# Patient Record
Sex: MALE | Race: WHITE | HISPANIC OR LATINO | Employment: FULL TIME | URBAN - METROPOLITAN AREA
[De-identification: names, ages, dates, MRNs, and addresses within clinical notes are randomized per-mention and may not be internally consistent; named-entity substitution may affect disease eponyms.]

---

## 2020-12-18 ENCOUNTER — OFFICE VISIT - HEALTHEAST (OUTPATIENT)
Dept: FAMILY MEDICINE | Facility: CLINIC | Age: 45
End: 2020-12-18

## 2020-12-18 DIAGNOSIS — R51.9 ACUTE NONINTRACTABLE HEADACHE, UNSPECIFIED HEADACHE TYPE: ICD-10-CM

## 2020-12-18 DIAGNOSIS — R11.2 NON-INTRACTABLE VOMITING WITH NAUSEA, UNSPECIFIED VOMITING TYPE: ICD-10-CM

## 2020-12-18 DIAGNOSIS — R09.02 HYPOXIA: ICD-10-CM

## 2020-12-19 ENCOUNTER — COMMUNICATION - HEALTHEAST (OUTPATIENT)
Dept: SCHEDULING | Facility: CLINIC | Age: 45
End: 2020-12-19

## 2020-12-20 ENCOUNTER — COMMUNICATION - HEALTHEAST (OUTPATIENT)
Dept: SCHEDULING | Facility: CLINIC | Age: 45
End: 2020-12-20

## 2020-12-21 ENCOUNTER — COMMUNICATION - HEALTHEAST (OUTPATIENT)
Dept: NURSING | Facility: CLINIC | Age: 45
End: 2020-12-21

## 2020-12-21 ENCOUNTER — AMBULATORY - HEALTHEAST (OUTPATIENT)
Dept: NURSING | Facility: CLINIC | Age: 45
End: 2020-12-21

## 2020-12-21 DIAGNOSIS — U07.1 2019 NOVEL CORONAVIRUS DISEASE (COVID-19): ICD-10-CM

## 2021-06-05 VITALS
SYSTOLIC BLOOD PRESSURE: 128 MMHG | HEART RATE: 103 BPM | DIASTOLIC BLOOD PRESSURE: 83 MMHG | OXYGEN SATURATION: 91 % | WEIGHT: 148 LBS

## 2021-06-13 NOTE — TELEPHONE ENCOUNTER
"Coronavirus (COVID-19) Notification    Caller Name (Patient, parent, daughter/son, grandparent, etc)  Patient     Reason for call  Notify of Positive Coronavirus (COVID-19) lab results, assess symptoms,  review [facility] recommendations    Lab Result    Lab test:  2019-nCoV rRt-PCR or SARS-CoV-2 PCR    Oropharyngeal AND/OR nasopharyngeal swabs is POSITIVE for 2019-nCoV RNA/SARS-COV-2 PCR (COVID-19 virus)    RN Recommendations/Instructions per [facility] Coronavirus COVID-19 recommendations    Brief introduction script  Introduce self and then review script:  \"I am calling on behalf of [employer].  We were notified that your Coronavirus test (COVID-19) for was POSITIVE for the virus.  I have some information to relay to you but first I wanted to mention that the [place] Dept of Health will be contacting you shortly [it's possible MD already called Patient] to talk to you more about how you are feeling and other people you have had contact with who might now also have the virus.  Also, [facility] is Partnering with the [facility] for Covid-19 research, you may be contacted directly by research staff.\"    Assessment (Inquire about Patient's current symptoms)   Assessment   Current Symptoms at time of phone call: (if no symptoms, document No symptoms] headache   Symptom onset (if applicable) 12/17/20     If at time of call, Patients symptoms hare worsened, the Patient should contact 911 or have someone drive them to Emergency Dept promptly:      If Patient calling 911, inform 911 personal that you have tested positive for the Coronavirus (COVID-19).  Place mask on and await 911 to arrive.    If Emergency Dept, If possible, please have another adult drive you to the Emergency Dept but you need to wear mask when in contact with other people.        Monoclonal Antibody Administration    You may be eligible to receive a new treatment with a monoclonal antibody for preventing hospitalization in " "patients at high risk for complications from COVID-19.   This medication is still experimental and available on a limited basis; it is given through an IV and must be given at an infusion center. Please note that not all people who are eligible will receive the medication since it is in limited supply.     Are you interested in being considered for this medication?  No.  Does the patient fit the criteria: No    If patient qualifies based on above criteria:  \"We will contact you if you are selected to receive the medication in the next 1-2 days.   This is time sensitive and if you are not selected in the next 1-2 days, you will not receive the medication.  If you do not receive a call to schedule, you have not been selected.\"    Review information with Patient    Your result was positive. This means you have COVID-19 (coronavirus).  We have sent you a letter that reviews the information that I'll be reviewing with you now.    How can I protect others?    If you have symptoms: stay home and away from others (self-isolate) until:    You've had no fever--and no medicine that reduces fever--for 1 full day (24 hours). And      Your other symptoms have gotten better. For example, your cough or breathing has improved. And     At least 10 days have passed since your symptoms started. (If you ve been told by a doctor that you have a weak immune system, wait 20 days.)     If you don't have symptoms: Stay home and away from others (self-isolate) until at least 10 days have passed since your first positive COVID-19 test. (Date test collected).    During this time:    Stay in your own room, including for meals. Use your own bathroom if you can.    Stay away from others in your home. No hugging, kissing or shaking hands. No visitors.     Don't go to work, school or anywhere else.     Clean  high touch  surfaces often (doorknobs, counters, handles, etc.). Use a household cleaning spray or wipes. You'll find a full list on the EPA " website at www.epa.gov/pesticide-registration/list-n-disinfectants-use-against-sars-cov-2.     Cover your mouth and nose with a mask, tissue or other face covering to avoid spreading germs.    Wash your hands and face often with soap and water.    Caregivers in these groups are at risk for severe illness due to COVID-19:  o People 65 years and older  o People who live in a nursing home or long-term care facility  o People with chronic disease (lung, heart, cancer, diabetes, kidney, liver, immunologic)  o People who have a weakened immune system, including those who:  - Are in cancer treatment  - Take medicine that weakens the immune system, such as corticosteroids  - Had a bone marrow or organ transplant  - Have an immune deficiency  - Have poorly controlled HIV or AIDS  - Are obese (body mass index of 40 or higher)  - Smoke regularly    Caregivers should wear gloves while washing dishes, handling laundry and cleaning bedrooms and bathrooms.    Wash and dry laundry with special caution. Don't shake dirty laundry, and use the warmest water setting you can.    If you have a weakened immune system, ask your doctor about other actions you should take.    For more tips, go to www.cdc.gov/coronavirus/2019-ncov/downloads/10Things.pdf.    You should not go back to work until you meet the guidelines above for ending your home isolation. You don't need to be retested for COVID-19 before going back to work--studies show that you won't spread the virus if it's been at least 10 days since your symptoms started (or 20 days, if you have a weak immune system).    Employers: This document serves as formal notice of your employee's medical guidelines for going back to work. They must meet the above guidelines before going back to work in person.    How can I take care of myself?    1. Get lots of rest. Drink extra fluids (unless a doctor has told you not to).    2. Take Tylenol (acetaminophen) for fever or pain. If you have liver or  kidney problems, ask your family doctor if it's okay to take Tylenol.     Take either:     650 mg (two 325 mg pills) every 4 to 6 hours, or     1,000 mg (two 500 mg pills) every 8 hours as needed.     Note: Don't take more than 3,000 mg in one day. Acetaminophen is found in many medicines (both prescribed and over-the-counter medicines). Read all labels to be sure you don't take too much.    For children, check the Tylenol bottle for the right dose (based on their age or weight).    3. If you have other health problems (like cancer, heart failure, an organ transplant or severe kidney disease): Call your specialty clinic if you don't feel better in the next 2 days.    4. Know when to call 911: Emergency warning signs include:    Trouble breathing or shortness of breath    Pain or pressure in the chest that doesn't go away    Feeling confused like you haven't felt before, or not being able to wake up    Bluish-colored lips or face    5. Sign up for [employer]. We know it's scary to hear that you have COVID-19. We want to track your symptoms to make sure you're okay over the next 2 weeks. Please look for an email from [employer]--this is a free, online program that we'll use to keep in touch. To sign up, follow the link in the email. Learn more at [email or web address].    Where can I get more information?    [facility]: [email or web address]    Coronavirus Basics: www.health.[facility].[place]/diseases/coronavirus/basics.html    What to Do If You're Sick: www.cdc.gov/coronavirus/2019-ncov/about/steps-when-sick.html    Ending Home Isolation: www.cdc.gov/coronavirus/2019-ncov/hcp/disposition-in-home-patients.html     Caring for Someone with COVID-19: www.cdc.gov/coronavirus/2019-ncov/if-you-are-sick/care-for-someone.html     [facility] clinical trials (COVID-19 research studies): [email or web address]-[email or web address]     A Positive COVID-19 letter will be sent via [employer] or the  Mail.  (Exception, no letters sent to Presurgerical/Preprocedure Patients)    Sergio Watts RN

## 2021-06-13 NOTE — PROGRESS NOTES
Clinic Care Coordination Contact  [facility]/Voicemail    Clinical Data: Care Coordinator Outreach  Outreach attempted x 1.  Left message on patient's voicemail with call back information and requested return call.  Plan: Care Coordinator will try to reach patient again in 1-2 business days.

## 2021-06-13 NOTE — PROGRESS NOTES
Clinic Care Coordination Contact  [facility]/Voicemail    Clinical Data: Care Coordinator Outreach  Outreach attempted x 2.  Left message on patient's voicemail with call back information and requested return call.  Plan: Care Coordinator will do no further outreaches at this time.

## 2021-06-13 NOTE — PROGRESS NOTES
Impression:  Headache, vomiting, weakness in a patient who was recently exposed to Covid.  He did have a negative Covid test but may have had a false negative test and now has Covid pneumonia with hypoxia.  He also uses some kind of a portable heater in his bedroom and may have carbon monoxide poisoning as well or other causes    Plan:  Given the hypoxia and tachycardia, he is can need further evaluation work-up.  We will send [facility]      Chief Complaint:  Chief Complaint   Patient presents with     Nausea     Headache     Suspected Covid     possible covid exposure         HPI:   Robert Pimentel is a 45 y.o. male who presents to this clinic for the evaluation of headache vomiting and weakness.  Patient awoke during the middle of the night with a severe headache, vomiting, and generalized weakness.  He did feel short of breath but did not have a cough.  He was exposed to someone with Covid 8 days ago, he had a negative Covid test after that.  No diarrhea, no skin rash.  He uses a portable heater in his room, he is not sure but he thinks it is electric.  He is not sure what kind a heating his house has otherwise.  Nobody else in the house got sick but he is worried about carbon monoxide as a possible cause because of the portable heater      PMH:   No past medical history on file.  No past surgical history on file.      ROS:  All other systems negative    Meds:  No current outpatient medications on file.        Social:  Social History     Socioeconomic History     Marital status: Single     Spouse name: Not on file     Number of children: Not on file     Years of education: Not on file     Highest education level: Not on file   Occupational History     Not on file   Social Needs     Financial resource strain: Not on file     Food insecurity     Worry: Not on file     Inability: Not on file     Transportation needs     Medical: Not on file     Non-medical: Not on file    Tobacco Use     Smoking status: Not on file   Substance and Sexual Activity     Alcohol use: Not on file     Drug use: Not on file     Sexual activity: Not on file   Lifestyle     Physical activity     Days per week: Not on file     Minutes per session: Not on file     Stress: Not on file   Relationships     Social connections     Talks on phone: Not on file     Gets together: Not on file     Attends Protestant service: Not on file     Active member of club or organization: Not on file     Attends meetings of clubs or organizations: Not on file     Relationship status: Not on file     Intimate partner violence     Fear of current or ex partner: Not on file     Emotionally abused: Not on file     Physically abused: Not on file     Forced sexual activity: Not on file   Other Topics Concern     Not on file   Social History Narrative     Not on file         Physical Exam:  Vitals:    12/18/20 1613   BP: 128/83   Patient Site: Right Arm   Patient Position: Sitting   Cuff Size: Adult Regular   Pulse: (!) 103   SpO2: 91%   Weight: 148 lb (67.1 kg)      Vital signs reviewed  Eyes: PERRL, EOMI  Head: Atraumatic and normocephalic  Lungs: Shallow inspiratory efforts, he coughs during expiration, I do not hear any rales or rhonchi or wheezes  CV: Regular without murmur  Abdomen: Nontender without mass  Extremities: No tenderness or deformity  Skin: No lesions or rash  Neuro: Normal motor and sensory function in all extremities  Psych: Awake, alert, normally responsive      Results:    No results found for this or any previous visit (from the past 24 hour(s)).    No results found.      Mina Esteves MD

## 2022-03-23 ENCOUNTER — ANCILLARY PROCEDURE (OUTPATIENT)
Dept: GENERAL RADIOLOGY | Facility: CLINIC | Age: 47
End: 2022-03-23
Attending: PHYSICIAN ASSISTANT
Payer: COMMERCIAL

## 2022-03-23 ENCOUNTER — OFFICE VISIT (OUTPATIENT)
Dept: FAMILY MEDICINE | Facility: CLINIC | Age: 47
End: 2022-03-23
Payer: COMMERCIAL

## 2022-03-23 VITALS
SYSTOLIC BLOOD PRESSURE: 128 MMHG | BODY MASS INDEX: 22.13 KG/M2 | OXYGEN SATURATION: 97 % | WEIGHT: 146 LBS | HEART RATE: 78 BPM | HEIGHT: 68 IN | DIASTOLIC BLOOD PRESSURE: 80 MMHG

## 2022-03-23 DIAGNOSIS — M25.531 PAIN IN BOTH WRISTS: ICD-10-CM

## 2022-03-23 DIAGNOSIS — M25.532 PAIN IN BOTH WRISTS: ICD-10-CM

## 2022-03-23 DIAGNOSIS — M25.531 PAIN IN BOTH WRISTS: Primary | ICD-10-CM

## 2022-03-23 DIAGNOSIS — L98.9 SKIN LESION: ICD-10-CM

## 2022-03-23 DIAGNOSIS — M25.532 PAIN IN BOTH WRISTS: Primary | ICD-10-CM

## 2022-03-23 DIAGNOSIS — R52 PAIN: ICD-10-CM

## 2022-03-23 LAB
ANION GAP SERPL CALCULATED.3IONS-SCNC: 11 MMOL/L (ref 5–18)
BUN SERPL-MCNC: 12 MG/DL (ref 8–22)
C REACTIVE PROTEIN LHE: <0.1 MG/DL (ref 0–0.8)
CALCIUM SERPL-MCNC: 9.4 MG/DL (ref 8.5–10.5)
CHLORIDE BLD-SCNC: 108 MMOL/L (ref 98–107)
CO2 SERPL-SCNC: 23 MMOL/L (ref 22–31)
CREAT SERPL-MCNC: 0.64 MG/DL (ref 0.7–1.3)
ERYTHROCYTE [DISTWIDTH] IN BLOOD BY AUTOMATED COUNT: 12.9 % (ref 10–15)
ERYTHROCYTE [SEDIMENTATION RATE] IN BLOOD BY WESTERGREN METHOD: 5 MM/HR (ref 0–15)
GFR SERPL CREATININE-BSD FRML MDRD: >90 ML/MIN/1.73M2
GLUCOSE BLD-MCNC: 90 MG/DL (ref 70–125)
HCT VFR BLD AUTO: 44.8 % (ref 40–53)
HGB BLD-MCNC: 15.4 G/DL (ref 13.3–17.7)
MCH RBC QN AUTO: 29.3 PG (ref 26.5–33)
MCHC RBC AUTO-ENTMCNC: 34.4 G/DL (ref 31.5–36.5)
MCV RBC AUTO: 85 FL (ref 78–100)
PLATELET # BLD AUTO: 245 10E3/UL (ref 150–450)
POTASSIUM BLD-SCNC: 4.4 MMOL/L (ref 3.5–5)
RBC # BLD AUTO: 5.26 10E6/UL (ref 4.4–5.9)
RHEUMATOID FACT SER NEPH-ACNC: <15 IU/ML (ref 0–30)
SODIUM SERPL-SCNC: 142 MMOL/L (ref 136–145)
WBC # BLD AUTO: 5 10E3/UL (ref 4–11)

## 2022-03-23 PROCEDURE — 86140 C-REACTIVE PROTEIN: CPT | Performed by: PHYSICIAN ASSISTANT

## 2022-03-23 PROCEDURE — 86200 CCP ANTIBODY: CPT | Performed by: PHYSICIAN ASSISTANT

## 2022-03-23 PROCEDURE — 86039 ANTINUCLEAR ANTIBODIES (ANA): CPT | Performed by: PHYSICIAN ASSISTANT

## 2022-03-23 PROCEDURE — 73110 X-RAY EXAM OF WRIST: CPT | Mod: TC | Performed by: RADIOLOGY

## 2022-03-23 PROCEDURE — 99214 OFFICE O/P EST MOD 30 MIN: CPT | Performed by: PHYSICIAN ASSISTANT

## 2022-03-23 PROCEDURE — 85652 RBC SED RATE AUTOMATED: CPT | Performed by: PHYSICIAN ASSISTANT

## 2022-03-23 PROCEDURE — 80048 BASIC METABOLIC PNL TOTAL CA: CPT | Performed by: PHYSICIAN ASSISTANT

## 2022-03-23 PROCEDURE — 85027 COMPLETE CBC AUTOMATED: CPT | Performed by: PHYSICIAN ASSISTANT

## 2022-03-23 PROCEDURE — 86038 ANTINUCLEAR ANTIBODIES: CPT | Performed by: PHYSICIAN ASSISTANT

## 2022-03-23 PROCEDURE — 86431 RHEUMATOID FACTOR QUANT: CPT | Performed by: PHYSICIAN ASSISTANT

## 2022-03-23 PROCEDURE — 36415 COLL VENOUS BLD VENIPUNCTURE: CPT | Performed by: PHYSICIAN ASSISTANT

## 2022-03-23 ASSESSMENT — ENCOUNTER SYMPTOMS
EYE PAIN: 0
FEVER: 0
SHORTNESS OF BREATH: 0
DIAPHORESIS: 0
WHEEZING: 0
EYE DISCHARGE: 0
NAUSEA: 0
FATIGUE: 0
ABDOMINAL PAIN: 0
COUGH: 0
DIZZINESS: 0
SORE THROAT: 0
HEADACHES: 0
JOINT SWELLING: 0
EYE REDNESS: 0
PHOTOPHOBIA: 0
CHILLS: 0
EYE ITCHING: 0
NUMBNESS: 0
RHINORRHEA: 0
PALPITATIONS: 0
VOMITING: 0
SINUS PAIN: 0
DIARRHEA: 0
ACTIVITY CHANGE: 0

## 2022-03-23 NOTE — PROGRESS NOTES
Progress Note  3/23/22     Chief Complaint   Patient presents with     Hand Pain     both wrist pains, shoot pain at times, on going for years , lifting for work      Mole     mole on back of head,change in size getting bigger          HPI    Robert Pimentel is a pleasant  46 year old year old male  who present to the clinic today for relation of bilateral wrist pain and a skin lesion to the posterior aspect of his head.  He states that he has had wrist pain for about 3 to 4 years.  Over the last 6 months the pain has gotten worse.  The pain does come and go but he is having more days of wrist pain than not.  He states that the pain will last for a week and subside for a few days and then return.  He does wear wrist splints that help at times but not all the time.  Has not noticed any swelling or redness to the wrists.  No injury.  He works at [employer] and does lift heavy things.  He denies any numbness or tingling in the fingers.  No rashes.    The lesion on the right posterior aspect of his head was removed about 2 years ago.  It is grown back.  It is getting larger.  Its not painful.  It does bleed at times.  He has no history of skin cancer.    ROS    Review of Systems   Constitutional: Negative for activity change, chills, diaphoresis, fatigue and fever.   HENT: Negative for congestion, ear discharge, ear pain, nosebleeds, postnasal drip, rhinorrhea, sinus pain and sore throat.    Eyes: Negative for photophobia, pain, discharge, redness and itching.   Respiratory: Negative for cough, shortness of breath and wheezing.    Cardiovascular: Negative for chest pain and palpitations.   Gastrointestinal: Negative for abdominal pain, diarrhea, nausea and vomiting.   Musculoskeletal: Negative for joint swelling.        Bilateral wrist pain.  Ongoing for 3 years.  Pain is becoming more consistent.  Worse with range of motion of the wrist.  Better at rest.  No associated swelling that he has noted.  No injuries.  No numbness  "or tingling in the fingers.  Wearing splints helps on occasion.  He has not tried Tylenol or ibuprofen ice or heat.   Skin: Positive for rash.        Skin lesion on the posterior aspect of the right head.  Getting larger.  Was removed 2 years ago and has grown back.  No pain.  Does bleed at times.   Neurological: Negative for dizziness, numbness and headaches.            There is no problem list on file for this patient.       No past medical history on file.       Social History     Socioeconomic History     Marital status: Single     Spouse name: Not on file     Number of children: Not on file     Years of education: Not on file     Highest education level: Not on file   Occupational History     Not on file   Tobacco Use     Smoking status: Never Smoker     Smokeless tobacco: Never Used   Substance and Sexual Activity     Alcohol use: Not on file     Drug use: Not on file     Sexual activity: Not on file   Other Topics Concern     Not on file   Social History Narrative     Not on file     Social Determinants of Health     Financial Resource Strain: Not on file   Food Insecurity: Not on file   Transportation Needs: Not on file   Physical Activity: Not on file   Stress: Not on file   Social Connections: Not on file   Intimate Partner Violence: Not on file   Housing Stability: Not on file         No Known Allergies       Current Outpatient Medications   Medication     albuterol (PROAIR HFA;PROVENTIL HFA;VENTOLIN HFA) 90 mcg/actuation inhaler     No current facility-administered medications for this visit.            /80   Pulse 78   Ht 1.727 m (5' 8\")   Wt 66.2 kg (146 lb)   SpO2 97%   BMI 22.20 kg/m       No results found for this or any previous visit (from the past 240 hour(s)).     Physical Exam:     Physical Exam  Vitals and nursing note reviewed.   Constitutional:       General: He is not in acute distress.     Appearance: Normal appearance. He is normal weight. He is not toxic-appearing or " diaphoretic.   HENT:      Head: Normocephalic and atraumatic.   Eyes:      Conjunctiva/sclera: Conjunctivae normal.   Cardiovascular:      Rate and Rhythm: Normal rate.      Heart sounds: No murmur heard.    No friction rub. No gallop.   Pulmonary:      Effort: Pulmonary effort is normal.      Breath sounds: No wheezing, rhonchi or rales.   Musculoskeletal:      Cervical back: Neck supple.      Comments: No pain to palpation today on exam to wrist bilaterally.  No associated swelling or redness.  Full range of motion to the wrists without eliciting pain.  Negative Tinel's and Phalen test.  Sensation to the fingers.  No pain to the elbows.  The rest of the upper extremities are intact.  Pulses to the radial and ulnar aspects of both wrists are adequate.   Skin:     General: Skin is warm.      Findings: Lesion present.      Comments: 1 cm x 1 cm lesion raised flesh-colored to the posterior aspect of right head/neck.   Neurological:      Mental Status: He is alert.              Assessment/Plan:       ICD-10-CM    1. Pain in both wrists  M25.531 XR Wrist Bilateral G/E 3 Views    M25.532 Anti Nuclear Evelin IgG by IFA with Reflex     Cyclic Citrullinated Peptide Antibody IgG     Rheumatoid factor     ESR: Erythrocyte sedimentation rate     CRP, inflammation     CBC with platelets     Basic metabolic panel   2. Skin lesion  L98.9 Adult Dermatology Referral       #1 bilateral wrist pain-symptoms have been ongoing for about 3 years.  They last a week and then subside for a few days and then return.  He does wear wrist splints that helps at times.  He has not tried ibuprofen or Tylenol.  No associated swelling.  He is not having any numbness or tingling in his hands or do not think that this is carpal tunnel.  Considerations would be either osteoarthritis of his wrist or rheumatoid arthritis.  Also would like to rule out fractures.  We will do some x-rays today to look for osteoarthritis.  We will also do some lab work to rule  out autoimmune like toward arthritis.  Labs including CBC, BMP, PEGGY, CCP, rheumatoid factor, sed rate, and CRP.  The meantime I would like him to wear his splints as this helps at times.  Try taking Tylenol and ibuprofen to see if this helps him with his pain.  We will be in touch with him with his results once these are available.      #2 skin lesion to the right posterior aspect of head -he did have this removed 2 years ago.  Has grown back and is bigger.  It is not itchy.  It does bleed at times.  It is flesh-colored.  No skin cancer history.  He is exposed to sun light in the summer.  No other skin lesions that she is aware of.  We will have him follow-up with dermatology for skin check and to evaluate the lesion.  He is to monitor symptoms in the meantime and let me know if anything changes.

## 2022-03-24 LAB — CCP AB SER IA-ACNC: <0.5 U/ML

## 2022-03-25 ENCOUNTER — TELEPHONE (OUTPATIENT)
Dept: FAMILY MEDICINE | Facility: CLINIC | Age: 47
End: 2022-03-25
Payer: COMMERCIAL

## 2022-03-25 NOTE — TELEPHONE ENCOUNTER
----- Message from Hector Alvarado PA-C sent at 3/23/2022  3:34 PM CDT -----  Please call patient and let him know that his x-ray did not show any fractures or any evidence of arthritis.  It did show some degenerative cystic changes which rises question about TFCC injury which is a tendon within the wrist.  I have placed in a referral to [facility] for further evaluation.  I would recommend wearing his wrist splints as much as possible and trialing Tylenol and ibuprofen for the pain.

## 2022-03-25 NOTE — LETTER
"March 25, 2022      Robert Pimentel  [address]  [facility] [address]      Dear Robert Pimentel,     We have been unable to reach you by phone.     Enclosed are the results of your wrist xray. Please review Jose Alvarado' note below:      \" x-ray did not show any fractures or any evidence of arthritis.  It did show some degenerative cystic changes which rises question about TFCC injury which is a tendon within the wrist.  I have placed in a referral to [facility] for further evaluation.  I would recommend wearing his wrist splints as much as possible and trialing Tylenol and ibuprofen for the pain. \"    Sincerely,     [facility]    "

## 2022-03-25 NOTE — TELEPHONE ENCOUNTER
Left message to call back for: patient  Information to relay to patient: see below      Letter sent also.

## 2022-03-30 LAB
ANA PAT SER IF-IMP: ABNORMAL
ANA SER QL IF: ABNORMAL
ANA TITR SER IF: ABNORMAL {TITER}

## 2022-04-01 ENCOUNTER — TELEPHONE (OUTPATIENT)
Dept: FAMILY MEDICINE | Facility: CLINIC | Age: 47
End: 2022-04-01
Payer: COMMERCIAL

## 2022-04-01 NOTE — TELEPHONE ENCOUNTER
----- Message from Hector Alvarado PA-C sent at 4/1/2022  6:03 AM CDT -----  Call and let him know that his PEGGY ( autoimmunity test) came back borderline positive. Since the xrays of his wrist show possible tendon issues I think this would explain his wrist pain. If follows up with ortho and they don't find any abnormality that would explain his wrist pain I would like him to follow up with me and we can repeat this test in 2-3 months.

## 2022-04-01 NOTE — TELEPHONE ENCOUNTER
Left message to call back for: Robert  Information to relay to patient: Please relay message below.

## 2022-04-27 NOTE — TELEPHONE ENCOUNTER
Pt called back, TC relayed message. Pt does not have an upcoming appt with Ortho and not sure when he will. Pt is asking for a Rx for his PEGGY. He has a Dermatology appt in a month and would like something to help while he waits for his appt. Please send Rx to [facility] in [place].

## 2022-05-04 DIAGNOSIS — M25.532 PAIN IN BOTH WRISTS: Primary | ICD-10-CM

## 2022-05-04 DIAGNOSIS — M25.531 PAIN IN BOTH WRISTS: Primary | ICD-10-CM

## 2022-05-04 NOTE — TELEPHONE ENCOUNTER
Pt calling to check status, states he is waiting for a call back about medication to help him until he sees dermatology.     Please advise

## 2022-05-04 NOTE — TELEPHONE ENCOUNTER
There is no medication I can give him for a borderline PEGGY test. I think his wrist pain may be coming from a tendon issues  (that was noted on his xray of his wrist)more than likely some due to rheumatological as this is borderline positive. I would recommend following up with Ortho and re recheck the PEGGY in 3 months. If the recheck is still elevated or higher we will have him see rheumatology at that time.

## 2022-05-05 NOTE — TELEPHONE ENCOUNTER
Pt is not asking for medication for his wrist, it is for his other issue that he is seeing dermatology for a skin lesion.

## 2022-05-06 NOTE — TELEPHONE ENCOUNTER
Left message to call back for: patient  Information to relay to patient: see below from Jose perez and route back to [place]

## 2022-05-06 NOTE — TELEPHONE ENCOUNTER
There is not much I can give him for this skin lesion. It was not itchy. Has it become itchy or what are his symptoms he is wanting to treat?If not itchy I would like him to see derm for evaluation.

## 2022-05-10 NOTE — TELEPHONE ENCOUNTER
Left message to call back for: pt  Information to relay to patient: Please see below message from Jose Alvarado and route answer back to provider.

## 2022-05-25 ENCOUNTER — OFFICE VISIT (OUTPATIENT)
Dept: FAMILY MEDICINE | Facility: CLINIC | Age: 47
End: 2022-05-25
Attending: PHYSICIAN ASSISTANT
Payer: COMMERCIAL

## 2022-05-25 VITALS — SYSTOLIC BLOOD PRESSURE: 124 MMHG | DIASTOLIC BLOOD PRESSURE: 72 MMHG

## 2022-05-25 DIAGNOSIS — D48.9 NEOPLASM OF UNCERTAIN BEHAVIOR: Primary | ICD-10-CM

## 2022-05-25 PROCEDURE — 88305 TISSUE EXAM BY PATHOLOGIST: CPT | Performed by: DERMATOLOGY

## 2022-05-25 PROCEDURE — 11102 TANGNTL BX SKIN SINGLE LES: CPT | Performed by: NURSE PRACTITIONER

## 2022-05-25 NOTE — PROGRESS NOTES
[facility] Dermatology Note  Encounter Date: May 25, 2022  Office Visit     Reviewed patients past medical history and pertinent chart review prior to patients visit today.     Dermatology Problem List:  Has had presumed wart on the posterior scalp treated with cryotherapy and shave removal without pathology 8/30/2021 but it grew back    ____________________________________________    Assessment & Plan:     # Neoplasm of uncertain behavior: Posterior scalp DDx includes verruca vulgaris vs SCC. Shave biopsy today.    Procedure Note: Biopsy by shave technique  The risks and benefits of the procedure were described to the patient. These include but are not limited to bleeding, infection, scar, incomplete removal, and non-diagnostic biopsy. Verbal informed consent was obtained. The above site(s) was cleansed with an alcohol pad and injected with 1% lidocaine with epinephrine. Once anesthesia was obtained, a biopsy(ies) was performed with Gilette blade. The tissue(s) was placed in a labeled container(s) with formalin and sent to pathology. Hemostasis was achieved with aluminum chloride. Vaseline and a bandage were applied to the wound(s). The patient tolerated the procedure well and was given post biopsy care instructions.     Clotilde Martinez, JULIETTE CNP on 5/25/2022 at 9:53 AM   _______________________________________    CC: Lesion (Back of scalp for over 2 years.)    HPI:  Mr. Robert Pimentel is a(n) 46 year old male who presents today as a new patient for presumed wart on the back of his scalp.  It has been present for about 2 years.  He saw primary care in 2021 and he says it was treated with freezing and then was cut off by his primary care provider.  I found this note from 8/30/2021 showing a shave removal but does not look like it was sent for pathology.  He would like it removed again today.    Patient is otherwise feeling well, without additional skin concerns.      Physical Exam:  Vitals: BP  124/72   SKIN: Focused examination of posterior scalp was performed.  -About a 5 to 6 mm hyperkeratotic papule on the posterior scalp    - No other lesions of concern on areas examined.     Medications:  Current Outpatient Medications   Medication     albuterol (PROAIR HFA;PROVENTIL HFA;VENTOLIN HFA) 90 mcg/actuation inhaler     No current facility-administered medications for this visit.      Past Medical History:   There is no problem list on file for this patient.    No past medical history on file.    CC Hector Alvarado PA-C  [address] [place] [address] [place] [phone] on close of this encounter.

## 2022-05-25 NOTE — LETTER
5/25/2022         RE: Robert Pimentel  [address] [facility] [address]        Dear Colleague,    Thank you for referring your patient, Robert Pimentel, to the [facility]. Please see a copy of my visit note below.    [facility] Dermatology Note  Encounter Date: May 25, 2022  Office Visit     Reviewed patients past medical history and pertinent chart review prior to patients visit today.     Dermatology Problem List:  Has had presumed wart on the posterior scalp treated with cryotherapy and shave removal without pathology 8/30/2021 but it grew back    ____________________________________________    Assessment & Plan:     # Neoplasm of uncertain behavior: Posterior scalp DDx includes verruca vulgaris vs SCC. Shave biopsy today.    Procedure Note: Biopsy by shave technique  The risks and benefits of the procedure were described to the patient. These include but are not limited to bleeding, infection, scar, incomplete removal, and non-diagnostic biopsy. Verbal informed consent was obtained. The above site(s) was cleansed with an alcohol pad and injected with 1% lidocaine with epinephrine. Once anesthesia was obtained, a biopsy(ies) was performed with Gilette blade. The tissue(s) was placed in a labeled container(s) with formalin and sent to pathology. Hemostasis was achieved with aluminum chloride. Vaseline and a bandage were applied to the wound(s). The patient tolerated the procedure well and was given post biopsy care instructions.     Clotilde Martinez, APRN CNP on 5/25/2022 at 9:53 AM   _______________________________________    CC: Lesion (Back of scalp for over 2 years.)    HPI:  Mr. Robert Pimentel is a(n) 46 year old male who presents today as a new patient for presumed wart on the back of his scalp.  It has been present for about 2 years.  He saw primary care in 2021 and he says it was treated with freezing and then was cut off by his primary care  provider.  I found this note from 8/30/2021 showing a shave removal but does not look like it was sent for pathology.  He would like it removed again today.    Patient is otherwise feeling well, without additional skin concerns.      Physical Exam:  Vitals: /72   SKIN: Focused examination of posterior scalp was performed.  -About a 5 to 6 mm hyperkeratotic papule on the posterior scalp    - No other lesions of concern on areas examined.     Medications:  Current Outpatient Medications   Medication     albuterol (PROAIR HFA;PROVENTIL HFA;VENTOLIN HFA) 90 mcg/actuation inhaler     No current facility-administered medications for this visit.      Past Medical History:   There is no problem list on file for this patient.    No past medical history on file.    CC Hector Alvarado PA-C  [address] [place] [address] on close of this encounter.       Again, thank you for allowing me to participate in the care of your patient.        Sincerely,        JULIETTE Julian CNP

## 2022-05-25 NOTE — PATIENT INSTRUCTIONS
Wound Care Instructions     FOR SUPERFICIAL WOUNDS     [facility] or     [facility] [phone]          AFTER 24 HOURS YOU SHOULD REMOVE THE BANDAGE AND BEGIN DAILY DRESSING CHANGES AS FOLLOWS:     1) Remove Dressing.     2) Clean and dry the area with tap water using a Q-tip or sterile gauze pad.     3) Apply Vaseline, Aquaphor, Polysporin ointment or Bacitracin ointment over entire wound.  Do NOT use Neosporin ointment.     4) Cover the wound with a band-aid, or a sterile non-stick gauze pad and micropore paper tape      REPEAT THESE INSTRUCTIONS AT LEAST ONCE A DAY UNTIL THE WOUND HAS COMPLETELY HEALED.    It is an old wives tale that a wound heals better when it is exposed to air and allowed to dry out. The wound will heal faster with a better cosmetic result if it is kept moist with ointment and covered with a bandage.    **Do not let the wound dry out.**      Supplies Needed:      *Cotton tipped applicators (Q-tips)    *Polysporin Ointment or Bacitracin Ointment (NOT NEOSPORIN)    *Band-aids or non-stick gauze pads and micropore paper tape.      PATIENT INFORMATION:    During the healing process you will notice a number of changes. All wounds develop a small halo of redness surrounding the wound.  This means healing is occurring. Severe itching with extensive redness usually indicates sensitivity to the ointment or bandage tape used to dress the wound.  You should call our office if this develops.      Swelling  and/or discoloration around your surgical site is common, particularly when performed around the eye.    All wounds normally drain.  The larger the wound the more drainage there will be.  After 7-10 days, you will notice the wound beginning to shrink and new skin will begin to grow.  The wound is healed when you can see skin has formed over the entire area.  A healed wound has a healthy, shiny look to the surface and is red to dark pink in color to normalize.   Wounds may take approximately 4-6 weeks to heal.  Larger wounds may take 6-8 weeks.  After the wound is healed you may discontinue dressing changes.    You may experience a sensation of tightness as your wound heals. This is normal and will gradually subside.    Your healed wound may be sensitive to temperature changes. This sensitivity improves with time, but if you re having a lot of discomfort, try to avoid temperature extremes.    Patients frequently experience itching after their wound appears to have healed because of the continue healing under the skin.  Plain Vaseline will help relieve the itching.        POSSIBLE COMPLICATIONS    BLEEDING:    Leave the bandage in place.  Use tightly rolled up gauze or a cloth to apply direct pressure over the bandage for 30  minutes.  Reapply pressure for an additional 30 minutes if necessary  Use additional gauze and tape to maintain pressure once the bleeding has stopped.

## 2022-05-27 ENCOUNTER — TELEPHONE (OUTPATIENT)
Dept: FAMILY MEDICINE | Facility: CLINIC | Age: 47
End: 2022-05-27
Payer: COMMERCIAL

## 2022-05-27 LAB
PATH REPORT.COMMENTS IMP SPEC: NORMAL
PATH REPORT.COMMENTS IMP SPEC: NORMAL
PATH REPORT.FINAL DX SPEC: NORMAL
PATH REPORT.GROSS SPEC: NORMAL
PATH REPORT.MICROSCOPIC SPEC OTHER STN: NORMAL
PATH REPORT.RELEVANT HX SPEC: NORMAL

## 2022-05-27 NOTE — TELEPHONE ENCOUNTER
Left message for pt to call dermatology nurse at [phone].  Advised there is no vm at this number, if no answer to call back at a later time.    Give Smliey's message below when pt calls back.    Shania RODRIGUEZ RN  [facility] [place]  [phone]

## 2022-05-31 NOTE — TELEPHONE ENCOUNTER
"He could try 40% salicylic acid from [employer] like \"wart stick\" nightly under occlusion but it will cause an erosion on his head in this area, or he could come in and we can treat with liquid nitrogen and discuss other options. I would allow the biopsy site to heal prior to applying wart stick though.   "

## 2022-05-31 NOTE — TELEPHONE ENCOUNTER
Pt called back and was given Smiley's reply below.  Pt is wondering if there is something that can be prescribed to help with the wart.  Has tried over the counter and it keeps coming back.  Wondering if there is something stronger?    Pharmacy pended.    Shania RODRIGUEZ RN  [facility] [place]  [phone]

## 2022-06-01 NOTE — TELEPHONE ENCOUNTER
Detailed message left with info from provider and return number to call with any questions or concerns.    Shania RODRIGUEZ RN  [facility] [place]  [phone]

## 2024-10-22 ENCOUNTER — OFFICE VISIT (OUTPATIENT)
Dept: FAMILY MEDICINE | Facility: CLINIC | Age: 49
End: 2024-10-22

## 2024-10-22 ENCOUNTER — ANCILLARY PROCEDURE (OUTPATIENT)
Dept: GENERAL RADIOLOGY | Facility: CLINIC | Age: 49
End: 2024-10-22
Attending: PHYSICIAN ASSISTANT
Payer: COMMERCIAL

## 2024-10-22 VITALS
RESPIRATION RATE: 16 BRPM | DIASTOLIC BLOOD PRESSURE: 89 MMHG | SYSTOLIC BLOOD PRESSURE: 124 MMHG | TEMPERATURE: 98.3 F | OXYGEN SATURATION: 98 % | HEART RATE: 69 BPM

## 2024-10-22 DIAGNOSIS — M22.2X1 PATELLOFEMORAL PAIN SYNDROME OF RIGHT KNEE: Primary | ICD-10-CM

## 2024-10-22 DIAGNOSIS — M25.561 ACUTE PAIN OF RIGHT KNEE: ICD-10-CM

## 2024-10-22 PROCEDURE — 99213 OFFICE O/P EST LOW 20 MIN: CPT | Performed by: PHYSICIAN ASSISTANT

## 2024-10-22 PROCEDURE — 73562 X-RAY EXAM OF KNEE 3: CPT | Mod: TC | Performed by: STUDENT IN AN ORGANIZED HEALTH CARE EDUCATION/TRAINING PROGRAM

## 2024-10-22 RX ORDER — MELOXICAM 15 MG/1
15 TABLET ORAL DAILY
Qty: 14 TABLET | Refills: 0 | Status: SHIPPED | OUTPATIENT
Start: 2024-10-22 | End: 2024-11-05

## 2024-10-22 NOTE — PROGRESS NOTES
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  Patient will be treated for anterior knee pain with patellofemoral syndrome.  Use of meloxicam for anti-inflammatory effect.  Activity modification. Expected course of resolution and indication for return was gone over and questions were answered to patient/parent's satisfaction before discharge.        ICD-10-CM    1. Patellofemoral pain syndrome of right knee  M22.2X1 meloxicam (MOBIC) 15 MG tablet      2. Acute pain of right knee  M25.561 XR Knee Right 3 Views     meloxicam (MOBIC) 15 MG tablet          Patient Instructions     Ice topically to the area 20 minutes on each hour while awake.  Take precautions to avoid damage to the skin.  After 2 days, transition to ice topically 20 minutes 3 times per day.  After 2 days may add heat and alternate ice and heat.  Mobic once a day with food for analgesia and anti-inflammatory effect.  Do not use ibuprofen products with the Mobic and do not use if you have contraindications to using NSAIDs.  Injuries to the extremities may be elevated above level of the heart continuously for the first 2 days as much as possible.  Use of over-the-counter neoprene slip on sleeve that is well fitted but not too tight to cut off circulation.    Return to see primary care provider or return to clinic for reexamination and nathaly-ray in 2 weeks if anything less than 100% resolution or return to pain-free weightbearing and full activities.      HPI:  Robert Pimentel is a 49 year old male who presents today for right anterior knee pain.  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  He tries to help lift the appliances with his knee and he has had some irritation of the right anterior knee.  He has pain with pressing on the knee and has had pain with  ascending and descending stairs and also after sitting for prolonged period of time and standing he gets anterior knee pain.  Has not had break in the skin bleeding ecchymoses or joint effusion.  At this time no involvement of the hip or ankle of the ipsilateral side or contralateral left lower extremity injury.  No treatments tried for this at home.    History obtained from chart review and the patient.    Problem List:  There are no relevant problems documented for this patient.      No past medical history on file.    Social History     Tobacco Use    Smoking status: Never    Smokeless tobacco: Never   Substance Use Topics    Alcohol use: Not on file       Review of Systems  As above in HPI otherwise negative.    Vitals:    10/22/24 1757   BP: 124/89   Pulse: 69   Resp: 16   Temp: 98.3  F (36.8  C)   TempSrc: Oral   SpO2: 98%       General: Patient is resting comfortably no acute distress is afebrile  HEENT: Head is normocephalic atraumatic   eyes are PERRL EOMI sclera anicteric   Skin: Without rash non-diaphoretic  Musculoskeletal:  Inspection of the right knee shows that the patella is not ballotable without erythema tender to palpation over the anterior medial aspect of the patella does reproduce the patient's symptoms.  Quadriceps mechanism and patellar tendon are intact.  No pain over the tibial plateau or over the anterior tibial tuberosity.  Medial and lateral collateral ligaments are intact to valgus and varus stressing.  Anterior drawer sign is negative.    Physical Exam      Labs:  Results for orders placed or performed in visit on 10/22/24   XR Knee Right 3 Views     Status: None    Narrative    EXAM: XR KNEE RIGHT 3 VIEWS  LOCATION: [facility]  DATE: 10/22/2024    INDICATION: Right patellar pain  COMPARISON: None.      Impression    IMPRESSION: Normal joint spaces and alignment. No fracture or joint effusion.       Radiology:  I have personally ordered and preliminarily  reviewed the following xray, I have noted no acute fractures or joint effusions.      At the end of the encounter, I discussed results, diagnosis, medications. Discussed red flags for immediate return to clinic/ER, as well as indications for follow up if no improvement. Patient understood and agreed to plan. Patient was stable for discharge.

## 2024-10-22 NOTE — PATIENT INSTRUCTIONS
Ice topically to the area 20 minutes on each hour while awake.  Take precautions to avoid damage to the skin.  After 2 days, transition to ice topically 20 minutes 3 times per day.  After 2 days may add heat and alternate ice and heat.  Mobic once a day with food for analgesia and anti-inflammatory effect.  Do not use ibuprofen products with the Mobic and do not use if you have contraindications to using NSAIDs.  Injuries to the extremities may be elevated above level of the heart continuously for the first 2 days as much as possible.  Use of over-the-counter neoprene slip on sleeve that is well fitted but not too tight to cut off circulation.    Return to see primary care provider or return to clinic for reexamination and nathaly-ray in 2 weeks if anything less than 100% resolution or return to pain-free weightbearing and full activities.

## 2024-12-08 ENCOUNTER — HEALTH MAINTENANCE LETTER (OUTPATIENT)
Age: 49
End: 2024-12-08

## 2024-12-12 ENCOUNTER — HOSPITAL ENCOUNTER (OUTPATIENT)
Dept: ULTRASOUND IMAGING | Facility: CLINIC | Age: 49
End: 2024-12-12
Attending: FAMILY MEDICINE
Payer: COMMERCIAL

## 2024-12-12 DIAGNOSIS — R10.32 LEFT GROIN PAIN: ICD-10-CM

## 2024-12-12 PROCEDURE — 76705 ECHO EXAM OF ABDOMEN: CPT

## 2024-12-17 ENCOUNTER — OFFICE VISIT (OUTPATIENT)
Dept: SURGERY | Facility: CLINIC | Age: 49
End: 2024-12-17
Payer: COMMERCIAL

## 2024-12-17 VITALS
WEIGHT: 153.1 LBS | SYSTOLIC BLOOD PRESSURE: 130 MMHG | DIASTOLIC BLOOD PRESSURE: 80 MMHG | HEIGHT: 63 IN | BODY MASS INDEX: 27.12 KG/M2

## 2024-12-17 DIAGNOSIS — R10.32 LEFT GROIN PAIN: ICD-10-CM

## 2024-12-17 DIAGNOSIS — K40.90 LEFT INGUINAL HERNIA: ICD-10-CM

## 2024-12-17 PROCEDURE — 99204 OFFICE O/P NEW MOD 45 MIN: CPT | Performed by: SURGERY

## 2024-12-17 NOTE — LETTER
"12/17/2024      Robert Pimentel  [address] [place]  [facility] [address]      Dear Colleague,    Thank you for referring your patient, Robert Pimentel, to the [facility] [place]. Please see a copy of my visit note below.    HPI:  Robert Pimentel is a 49 year old male who was referred to me for evaluation of a left inguinal hernia.  The patient has noticed some discomfort in the left groin for about 2 years.  More recently the discomfort, which she describes as a burning pain particular after working long days, has been more consistent.  He is also noticed a new bulge in the left groin.  He saw his primary care provider who ordered an ultrasound that revealed a hernia on the left side.  Patient is fairly anxious about this and is concerned that its become more symptomatic over the last few years.  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  He has had no prior surgeries.    Allergies:Patient has no known allergies.    PMH: Denies    PSH: Denies    No current outpatient medications on file.       No family history on file.     reports that he has never smoked. He has never been exposed to tobacco smoke. He has never used smokeless tobacco.      /80   Ht 1.6 m (5' 3\")   Wt 69.4 kg (153 lb 1.6 oz)   BMI 27.12 kg/m    Body mass index is 27.12 kg/m .    EXAM:  GENERAL: Well developed male in NAD  HEENT: Pupils are round and reactive, sclera are anicteric.   NECK:  No obvious masses or deformities  CV: RRR  PULM: Non-labored breathing on RA  ABDOMEN: Soft and nondistended  GROIN: Palpable left inguinal hernia.  Tender in this area.  Right side feels normal  NEURO: No obvious deficits noted.  EXT: No edema, no obvious deformities or any other abnormalities    IMAGES:   US  IMPRESSION:  Left inguinal hernia containing fat and small bowel, which does not completely reduce with pressure.    Assessment/Plan:    Robert Pimentel is a 49 year old male presenting " with a symptomatic left inguinal hernia.  We discussed the pathophysiology of this condition and treatment strategies including nonoperative and operative management.  I would recommend a laparoscopic robotic approach.  We discussed the details this operation and the risk of chronic groin pain and the use of mesh.  Patient would like to think about his options at this point.  I assured him that his hernia is fairly small and has low risk of serious complications at this point.  Patient will call us when he is ready to proceed.  We will plan for a robot-assisted left inguinal hernia repair.  I will perform his H&P on the day of surgery.    Benjy Thakkar MD  General Surgeon  [facility]  Surgery [address] [address]?  Office: [phone]      Again, thank you for allowing me to participate in the care of your patient.        Sincerely,        Benjy Thakkar MD

## 2024-12-17 NOTE — PROGRESS NOTES
"HPI:  Robert Pimentel is a 49 year old male who was referred to me for evaluation of a left inguinal hernia.  The patient has noticed some discomfort in the left groin for about 2 years.  More recently the discomfort, which she describes as a burning pain particular after working long days, has been more consistent.  He is also noticed a new bulge in the left groin.  He saw his primary care provider who ordered an ultrasound that revealed a hernia on the left side.  Patient is fairly anxious about this and is concerned that its become more symptomatic over the last few years.  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  He has had no prior surgeries.    Allergies:Patient has no known allergies.    PMH: Denies    PSH: Denies    No current outpatient medications on file.       No family history on file.     reports that he has never smoked. He has never been exposed to tobacco smoke. He has never used smokeless tobacco.      /80   Ht 1.6 m (5' 3\")   Wt 69.4 kg (153 lb 1.6 oz)   BMI 27.12 kg/m    Body mass index is 27.12 kg/m .    EXAM:  GENERAL: Well developed male in NAD  HEENT: Pupils are round and reactive, sclera are anicteric.   NECK:  No obvious masses or deformities  CV: RRR  PULM: Non-labored breathing on RA  ABDOMEN: Soft and nondistended  GROIN: Palpable left inguinal hernia.  Tender in this area.  Right side feels normal  NEURO: No obvious deficits noted.  EXT: No edema, no obvious deformities or any other abnormalities    IMAGES:   US  IMPRESSION:  Left inguinal hernia containing fat and small bowel, which does not completely reduce with pressure.    Assessment/Plan:    Robert Pimentel is a 49 year old male presenting with a symptomatic left inguinal hernia.  We discussed the pathophysiology of this condition and treatment strategies including nonoperative and operative management.  I would recommend a laparoscopic robotic approach.  We discussed the details this operation and the " risk of chronic groin pain and the use of mesh.  Patient would like to think about his options at this point.  I assured him that his hernia is fairly small and has low risk of serious complications at this point.  Patient will call us when he is ready to proceed.  We will plan for a robot-assisted left inguinal hernia repair.  I will perform his H&P on the day of surgery.    Benjy Thakkar MD  General Surgeon  [facility]  Surgery [address] [address]?  Office: [phone]

## 2024-12-23 ENCOUNTER — TELEPHONE (OUTPATIENT)
Dept: SURGERY | Facility: CLINIC | Age: 49
End: 2024-12-23
Payer: COMMERCIAL

## 2024-12-23 NOTE — TELEPHONE ENCOUNTER
Patient left a message stating he is ready to move forward with scheduling surgery. Requesting order from provider.     Patient notified via [employer] the order has been requested

## 2025-01-14 ENCOUNTER — PREP FOR PROCEDURE (OUTPATIENT)
Dept: SURGERY | Facility: CLINIC | Age: 50
End: 2025-01-14
Payer: COMMERCIAL

## 2025-01-14 DIAGNOSIS — K40.90 LEFT INGUINAL HERNIA: Primary | ICD-10-CM

## 2025-01-15 ENCOUNTER — TELEPHONE (OUTPATIENT)
Dept: SURGERY | Facility: CLINIC | Age: 50
End: 2025-01-15
Payer: COMMERCIAL

## 2025-02-12 NOTE — H&P (VIEW-ONLY)
Pre-Operative Assessment        2/12/2025   Pre-Op Assessment Procedure Details   Procedure HERNIORRHAPHY, INGUINAL, ROBOT-ASSISTED, LAPAROSCOPIC, USING DA KATELYN XI   Surgeon Benjy Thakkar   Location Other   Other Location Name [facility]   Procedure Date 2/14/2025   Fax Number [phone]         Jam Jones is a 49 y.o. old male here for pre-operative evaluation for procedure noted above.     Patient is coming in for preop. He is anticipating abdominal surgery to fix his left-sided hernia. Currently denies any abdominal pain, nausea, vomiting. Denies any infectious symptoms such as fevers, chills, chest pain, no shortness of breath. Denies any myalgias.    Patient Active Problem List    Diagnosis Date Noted     Non-recurrent unilateral inguinal hernia without obstruction or gangrene 01/21/2025     Closed nondisplaced fracture of sixth cervical vertebra with routine healing 11/21/2016        Past Medical History:   Diagnosis Date     Gastroesophageal reflux disease     It's  controled now        History reviewed. No pertinent surgical history.     Current Outpatient Medications   Medication Instructions     MULTIPLE VITAMIN OR No dose, route, or frequency recorded.       No Known Allergies    Social History     Occupational History     Not on file   Tobacco Use     Smoking status: Never     Smokeless tobacco: Never   Vaping Use     Vaping status: Never Used   Substance and Sexual Activity     Alcohol use: Yes     Alcohol/week: 2.0 standard drinks of alcohol     Types: 2 Cans of beer per week     Comment: I just drink occasionally     Drug use: Never     Sexual activity: Yes     Partners: Female     Birth control/protection: Condom         No LMP for male patient.    History reviewed. No pertinent family history.    Review of Systems:        2/12/2025   ET Amb PreOp Assessment Sx   Have you had a heart attack in the last 30 days? No   Have you experienced chest tightening or chest pressure with  "activity? No   Do you wake at night with difficulty breathing? No   Do you have swelling in your feet or ankles? No   Do you get short of breath if lying flat at night? No   Do you hear wheezing or whistling when you breathe? No   Have you had a cough, runny nose, or cold symptoms in the last 2 weeks? No   Have you tested positive for Covid in the last 6 months? No   Do you have a long-standing cough? No   Do you snore or are you sleepy during the day? No   Do you have any symptoms due to a recent concussion? No   Do you or close relatives have bleeding or clotting problems? No   Have you taken Aspirin, Ibuprofen (Advil) or Naproxen (Aleve) in the last 7 days? No   Do you or close relatives have a history of a severe or life-threatening reaction to anesthesia? No         Estimated Functional Capacity  Can you climb one flight of stairs, or walk up a gradual uphill without stopping? yes, functional capacity is more than or equal to 4 METS        Objective   /86 (BP Location: Right Arm, BP Cuff Size: Large)   Pulse 80   Ht 5' 4\" (1.626 m)   Wt 160 lb (72.6 kg)   BMI 27.46 kg/m      Physical Exam:  General Appearance: alert, well appearing, and in no apparent distress  Eyes:  lids normal, sclera clear, and conjunctiva normal  ENT:  oropharynx clear  Neck:  no lymphadenopathy and no thyromegaly or nodules  Heart:  regular rate and rhythm and no murmurs, gallops or rubs  Lungs:  clear to auscultation and no wheezes, rales or rhonchi  Abdomen:  soft, nondistended, nontender, and no organomegaly  Extremities:  no edema  Skin:  no rashes or worrisome lesions  Neurologic:  normal speech and no facial droop    Data:      Labs: Today: 2/12/2025. cbc.  ECG: Not indicated for this procedure.    Assessment/Plan   Patient is medically optimized for planned procedure(s).      ICD-10-CM    1. Preop examination  Z01.818 Complete Blood Count-No Diff            Special risks:  None    Medication recommendations:    Patient " Instructions   Follow your individualized medication recommendations as described above.    In addition, please stop all over-the-counter medications including aspirin, ibuprofen (Advil, Motrin), naproxen (Aleve, Naprosyn), herbal remedies and supplements one week prior to procedure unless directed otherwise by your care team. You may continue to take acetaminophen (Tylenol) up to the day of your procedure.    Continue all other medications as currently taking. Let your care team know if you have questions.    On the day of your procedure, do not wear any hair product including hair sprays and gels and avoid using body sprays and deodorants/antiperspirants.    Bring with you to the site of the procedure:    Any oral appliances or CPAP equipment related to sleep apnea  Any other health-related equipment or devices you use daily      Electronically signed by: Uriah Rice MD  2/12/2025, 2:26 PM  This note created using speech-recognition software and may contain unintended word substitutions.

## 2025-02-14 ENCOUNTER — HOSPITAL ENCOUNTER (OUTPATIENT)
Facility: CLINIC | Age: 50
Discharge: HOME | End: 2025-02-14
Attending: SURGERY | Admitting: SURGERY
Payer: COMMERCIAL

## 2025-02-14 VITALS
RESPIRATION RATE: 16 BRPM | DIASTOLIC BLOOD PRESSURE: 70 MMHG | WEIGHT: 155 LBS | TEMPERATURE: 97.8 F | HEART RATE: 82 BPM | OXYGEN SATURATION: 97 % | SYSTOLIC BLOOD PRESSURE: 124 MMHG | HEIGHT: 64 IN | BODY MASS INDEX: 26.46 KG/M2

## 2025-02-14 DIAGNOSIS — G89.18 ACUTE POST-OPERATIVE PAIN: Primary | ICD-10-CM

## 2025-02-14 LAB — GLUCOSE BLDC GLUCOMTR-MCNC: 84 MG/DL (ref 70–99)

## 2025-02-14 PROCEDURE — C1781 MESH (IMPLANTABLE): HCPCS | Performed by: SURGERY

## 2025-02-14 PROCEDURE — 250N000009 HC RX 250: Performed by: SURGERY

## 2025-02-14 PROCEDURE — 360N000080 HC SURGERY LEVEL 7, PER MIN: Performed by: SURGERY

## 2025-02-14 PROCEDURE — 710N000012 HC RECOVERY PHASE 2, PER MINUTE: Performed by: SURGERY

## 2025-02-14 PROCEDURE — 82962 GLUCOSE BLOOD TEST: CPT

## 2025-02-14 PROCEDURE — S2900 ROBOTIC SURGICAL SYSTEM: HCPCS | Performed by: SURGERY

## 2025-02-14 PROCEDURE — 258N000003 HC RX IP 258 OP 636: Performed by: ANESTHESIOLOGY

## 2025-02-14 PROCEDURE — 250N000013 HC RX MED GY IP 250 OP 250 PS 637: Performed by: ANESTHESIOLOGY

## 2025-02-14 PROCEDURE — 710N000010 HC RECOVERY PHASE 1, LEVEL 2, PER MIN: Performed by: SURGERY

## 2025-02-14 PROCEDURE — 250N000009 HC RX 250: Performed by: ANESTHESIOLOGY

## 2025-02-14 PROCEDURE — 250N000025 HC SEVOFLURANE, PER MIN: Performed by: SURGERY

## 2025-02-14 PROCEDURE — 250N000011 HC RX IP 250 OP 636: Performed by: ANESTHESIOLOGY

## 2025-02-14 PROCEDURE — 272N000001 HC OR GENERAL SUPPLY STERILE: Performed by: SURGERY

## 2025-02-14 PROCEDURE — 999N000141 HC STATISTIC PRE-PROCEDURE NURSING ASSESSMENT: Performed by: SURGERY

## 2025-02-14 PROCEDURE — 370N000017 HC ANESTHESIA TECHNICAL FEE, PER MIN: Performed by: SURGERY

## 2025-02-14 PROCEDURE — 49650 LAP ING HERNIA REPAIR INIT: CPT | Mod: LT | Performed by: SURGERY

## 2025-02-14 DEVICE — LAPAROSCOPIC SELF-FIXATING MESH POLYESTER WITH POLYLACTIC ACID GRIPS AND COLLAGEN FILM
Type: IMPLANTABLE DEVICE | Site: INGUINAL | Status: FUNCTIONAL
Brand: PROGRIP

## 2025-02-14 RX ORDER — NALOXONE HYDROCHLORIDE 0.4 MG/ML
0.1 INJECTION, SOLUTION INTRAMUSCULAR; INTRAVENOUS; SUBCUTANEOUS
Status: DISCONTINUED | OUTPATIENT
Start: 2025-02-14 | End: 2025-02-14 | Stop reason: HOSPADM

## 2025-02-14 RX ORDER — ONDANSETRON 2 MG/ML
4 INJECTION INTRAMUSCULAR; INTRAVENOUS EVERY 30 MIN PRN
Status: DISCONTINUED | OUTPATIENT
Start: 2025-02-14 | End: 2025-02-14 | Stop reason: HOSPADM

## 2025-02-14 RX ORDER — SODIUM CHLORIDE, SODIUM LACTATE, POTASSIUM CHLORIDE, CALCIUM CHLORIDE 600; 310; 30; 20 MG/100ML; MG/100ML; MG/100ML; MG/100ML
INJECTION, SOLUTION INTRAVENOUS CONTINUOUS
Status: DISCONTINUED | OUTPATIENT
Start: 2025-02-14 | End: 2025-02-14 | Stop reason: HOSPADM

## 2025-02-14 RX ORDER — BUPIVACAINE HYDROCHLORIDE AND EPINEPHRINE 2.5; 5 MG/ML; UG/ML
INJECTION, SOLUTION EPIDURAL; INFILTRATION; INTRACAUDAL; PERINEURAL
Status: DISCONTINUED
Start: 2025-02-14 | End: 2025-02-14 | Stop reason: HOSPADM

## 2025-02-14 RX ORDER — ONDANSETRON 4 MG/1
4 TABLET, ORALLY DISINTEGRATING ORAL EVERY 30 MIN PRN
Status: DISCONTINUED | OUTPATIENT
Start: 2025-02-14 | End: 2025-02-14 | Stop reason: HOSPADM

## 2025-02-14 RX ORDER — IPRATROPIUM BROMIDE AND ALBUTEROL SULFATE 2.5; .5 MG/3ML; MG/3ML
3 SOLUTION RESPIRATORY (INHALATION)
Status: DISCONTINUED | OUTPATIENT
Start: 2025-02-14 | End: 2025-02-14

## 2025-02-14 RX ORDER — OXYCODONE HYDROCHLORIDE 5 MG/1
2.5-5 TABLET ORAL EVERY 4 HOURS PRN
Qty: 6 TABLET | Refills: 0 | Status: SHIPPED | OUTPATIENT
Start: 2025-02-14

## 2025-02-14 RX ORDER — HYDROMORPHONE HCL IN WATER/PF 6 MG/30 ML
0.4 PATIENT CONTROLLED ANALGESIA SYRINGE INTRAVENOUS EVERY 5 MIN PRN
Status: DISCONTINUED | OUTPATIENT
Start: 2025-02-14 | End: 2025-02-14 | Stop reason: HOSPADM

## 2025-02-14 RX ORDER — IPRATROPIUM BROMIDE AND ALBUTEROL SULFATE 2.5; .5 MG/3ML; MG/3ML
3 SOLUTION RESPIRATORY (INHALATION) ONCE
Status: COMPLETED | OUTPATIENT
Start: 2025-02-14 | End: 2025-02-14

## 2025-02-14 RX ORDER — CEFAZOLIN SODIUM/WATER 2 G/20 ML
2 SYRINGE (ML) INTRAVENOUS
Status: COMPLETED | OUTPATIENT
Start: 2025-02-14 | End: 2025-02-14

## 2025-02-14 RX ORDER — LIDOCAINE 40 MG/G
CREAM TOPICAL
Status: DISCONTINUED | OUTPATIENT
Start: 2025-02-14 | End: 2025-02-14 | Stop reason: HOSPADM

## 2025-02-14 RX ORDER — CEFAZOLIN SODIUM/WATER 2 G/20 ML
2 SYRINGE (ML) INTRAVENOUS SEE ADMIN INSTRUCTIONS
Status: DISCONTINUED | OUTPATIENT
Start: 2025-02-14 | End: 2025-02-14 | Stop reason: HOSPADM

## 2025-02-14 RX ORDER — FENTANYL CITRATE 50 UG/ML
25 INJECTION, SOLUTION INTRAMUSCULAR; INTRAVENOUS EVERY 5 MIN PRN
Status: DISCONTINUED | OUTPATIENT
Start: 2025-02-14 | End: 2025-02-14 | Stop reason: HOSPADM

## 2025-02-14 RX ORDER — OXYCODONE HYDROCHLORIDE 5 MG/1
10 TABLET ORAL
Status: DISCONTINUED | OUTPATIENT
Start: 2025-02-14 | End: 2025-02-14 | Stop reason: HOSPADM

## 2025-02-14 RX ORDER — BUPIVACAINE HYDROCHLORIDE AND EPINEPHRINE 2.5; 5 MG/ML; UG/ML
INJECTION, SOLUTION INFILTRATION; PERINEURAL PRN
Status: DISCONTINUED | OUTPATIENT
Start: 2025-02-14 | End: 2025-02-14 | Stop reason: HOSPADM

## 2025-02-14 RX ORDER — HYDROMORPHONE HCL IN WATER/PF 6 MG/30 ML
0.2 PATIENT CONTROLLED ANALGESIA SYRINGE INTRAVENOUS EVERY 5 MIN PRN
Status: DISCONTINUED | OUTPATIENT
Start: 2025-02-14 | End: 2025-02-14 | Stop reason: HOSPADM

## 2025-02-14 RX ORDER — OXYCODONE HYDROCHLORIDE 5 MG/1
5 TABLET ORAL
Status: COMPLETED | OUTPATIENT
Start: 2025-02-14 | End: 2025-02-14

## 2025-02-14 RX ORDER — FENTANYL CITRATE 50 UG/ML
50 INJECTION, SOLUTION INTRAMUSCULAR; INTRAVENOUS EVERY 5 MIN PRN
Status: DISCONTINUED | OUTPATIENT
Start: 2025-02-14 | End: 2025-02-14 | Stop reason: HOSPADM

## 2025-02-14 RX ORDER — DEXAMETHASONE SODIUM PHOSPHATE 10 MG/ML
4 INJECTION, SOLUTION INTRAMUSCULAR; INTRAVENOUS
Status: DISCONTINUED | OUTPATIENT
Start: 2025-02-14 | End: 2025-02-14 | Stop reason: HOSPADM

## 2025-02-14 RX ORDER — DEXAMETHASONE SODIUM PHOSPHATE 4 MG/ML
4 INJECTION, SOLUTION INTRA-ARTICULAR; INTRALESIONAL; INTRAMUSCULAR; INTRAVENOUS; SOFT TISSUE
Status: DISCONTINUED | OUTPATIENT
Start: 2025-02-14 | End: 2025-02-14 | Stop reason: HOSPADM

## 2025-02-14 RX ADMIN — HYDROMORPHONE HYDROCHLORIDE 0.4 MG: 0.2 INJECTION, SOLUTION INTRAMUSCULAR; INTRAVENOUS; SUBCUTANEOUS at 11:08

## 2025-02-14 RX ADMIN — OXYCODONE 5 MG: 5 TABLET ORAL at 12:32

## 2025-02-14 RX ADMIN — FENTANYL CITRATE 50 MCG: 50 INJECTION INTRAMUSCULAR; INTRAVENOUS at 10:51

## 2025-02-14 RX ADMIN — HYDROMORPHONE HYDROCHLORIDE 0.2 MG: 0.2 INJECTION, SOLUTION INTRAMUSCULAR; INTRAVENOUS; SUBCUTANEOUS at 11:38

## 2025-02-14 RX ADMIN — HYDROMORPHONE HYDROCHLORIDE 0.4 MG: 0.2 INJECTION, SOLUTION INTRAMUSCULAR; INTRAVENOUS; SUBCUTANEOUS at 11:00

## 2025-02-14 RX ADMIN — SODIUM CHLORIDE, POTASSIUM CHLORIDE, SODIUM LACTATE AND CALCIUM CHLORIDE: 600; 310; 30; 20 INJECTION, SOLUTION INTRAVENOUS at 08:39

## 2025-02-14 RX ADMIN — IPRATROPIUM BROMIDE AND ALBUTEROL SULFATE 3 ML: .5; 3 SOLUTION RESPIRATORY (INHALATION) at 11:12

## 2025-02-14 RX ADMIN — HYDROMORPHONE HYDROCHLORIDE 0.4 MG: 0.2 INJECTION, SOLUTION INTRAMUSCULAR; INTRAVENOUS; SUBCUTANEOUS at 11:16

## 2025-02-14 RX ADMIN — HYDROMORPHONE HYDROCHLORIDE 0.2 MG: 0.2 INJECTION, SOLUTION INTRAMUSCULAR; INTRAVENOUS; SUBCUTANEOUS at 11:25

## 2025-02-14 RX ADMIN — ONDANSETRON 4 MG: 2 INJECTION, SOLUTION INTRAMUSCULAR; INTRAVENOUS at 13:23

## 2025-02-14 RX ADMIN — HYDROMORPHONE HYDROCHLORIDE 0.2 MG: 0.2 INJECTION, SOLUTION INTRAMUSCULAR; INTRAVENOUS; SUBCUTANEOUS at 11:47

## 2025-02-14 RX ADMIN — HYDROMORPHONE HYDROCHLORIDE 0.2 MG: 0.2 INJECTION, SOLUTION INTRAMUSCULAR; INTRAVENOUS; SUBCUTANEOUS at 11:31

## 2025-02-14 RX ADMIN — FENTANYL CITRATE 50 MCG: 50 INJECTION INTRAMUSCULAR; INTRAVENOUS at 10:46

## 2025-02-14 ASSESSMENT — ACTIVITIES OF DAILY LIVING (ADL)
ADLS_ACUITY_SCORE: 17
ADLS_ACUITY_SCORE: 15
ADLS_ACUITY_SCORE: 17
ADLS_ACUITY_SCORE: 15
ADLS_ACUITY_SCORE: 17
ADLS_ACUITY_SCORE: 15
ADLS_ACUITY_SCORE: 15

## 2025-02-14 NOTE — PHARMACY-ADMISSION MEDICATION HISTORY
Pharmacist completed medication history with the patient while in the [facility].  Prior to admission (PTA) med list completed and updated in the electronic medical record (EMR).  Pharmacy Note - Admission Medication History  Pertinent Provider Information: none   ______________________________________________________________________  Prior To Admission (PTA) med list completed and updated in EMR.     No outpatient medications have been marked as taking for the 2/14/25 encounter (Hospital Encounter).         Information source(s): Patient and CareEverywhere/SureScripts  Patient was asked about OTC/herbal products specifically.  PTA med list reflects this.  Based on the pharmacist s assessment, the PTA med list information appears reliable  Allergies were reviewed and assessed with the patient, responses were updated in the EMR.    Thank you for the opportunity to participate in the care of this patient.    Janes Wong RPH 2/14/2025 9:14 AM

## 2025-02-14 NOTE — INTERVAL H&P NOTE
"I have reviewed the surgical (or preoperative) H&P that is linked to this encounter, and examined the patient. There are no significant changes    Clinical Conditions Present on Arrival:  Clinically Significant Risk Factors Present on Admission                       # Overweight: Estimated body mass index is 26.61 kg/m  as calculated from the following:    Height as of this encounter: 1.626 m (5' 4\").    Weight as of this encounter: 70.3 kg (155 lb).       "

## 2025-02-14 NOTE — OP NOTE
General Surgery Operative Note    Date of Surgery: 02/14/25      Surgeon: Benjy Thakkar MD    Assistant: None    Preoperative Diagnosis: Left inguinal hernia    Postoperative Diagnosis: Left direct inguinal hernia    Procedure: Robot-assisted left inguinal herniorrhaphy with mesh    EBL: 5 cc    Findings: Small direct defect on the left side    Indications:  Patient is a 49-year-old man who presented to my clinic with symptomatic left inguinal hernia.  After discussion of the risks and benefits of surgery, the patient consented to the procedure.    Details of operation:  Patient was brought to the operating room and laid on the table in the supine position.  General anesthesia was induced without incident.  The patient was prepped and draped in usual sterile fashion.  A timeout for safety was performed.    I began by making incision above the umbilicus just off midline on the ipsilateral side of the hernia.  A Veress needle was introduced and pneumoperitoneum was established without incident.  An 8 mm optical robotic port was placed at the site.  There was no injury with entry.  I then placed 2 additional robotic ports in the upper abdomen.  The patient was placed in Trendelenburg position.  A 10 x 15 cm ProGrip mesh was placed into the abdomen along with a 3-0 V-Loc suture and a 3-0 Vicryl suture.  The robot was then docked.    I took my place the robotic console.  I began my preperitoneal flap proximately 6 cm above the direct defect.  This was carried down to Solomon's ligament medially and the iliopubic tract out laterally.  I reduced the direct defect.  There was no femoral defect.  There was no indirect defect.  There was no cord lipoma.  I dissected 2 cm below Solomon's ligament and across the midline.  I stripped the peritoneum down off of the cord structures to allow the mesh to sit flat.  I then laid the ProGrip mesh in place over the myopectineal orifice and secured it medially using a 3-0 Vicryl  stitch at Solomon's ligament.  I closed the peritoneal flap using a 3 0 V-Loc suture.  The preperitoneal space was evacuated and the mesh was laying flat behind the peritoneum.  At this point the needles were removed.  The robot was undocked and the ports removed after insufflation was released.  The skin was closed with 4-0 Monocryl suture and Dermabond was used as a dressing.  The patient tolerated the procedure well.  There were no immediately apparent complications.    Benjy Thakkar MD  General Surgeon  [facility]  Surgery [address] [address]?  Office: [phone]

## 2025-02-14 NOTE — PROGRESS NOTES
Pt and family verbalize good understanding of discharge teach and follow up with MD.   VSS,Surgical incision CDI. D/C criteria met. Pt verbalizes readiness to go home.   Pt given IS demonstrates good use of IS 2500.  Pt ambulated and used restroom voided. Denies nausea at time of discharge.   Home with brother.     @[facility]@ 2/14/2025 2:47 PM

## 2025-02-19 ENCOUNTER — TELEPHONE (OUTPATIENT)
Dept: SURGERY | Facility: CLINIC | Age: 50
End: 2025-02-19
Payer: COMMERCIAL

## 2025-02-19 ENCOUNTER — DOCUMENTATION ONLY (OUTPATIENT)
Dept: SURGERY | Facility: CLINIC | Age: 50
End: 2025-02-19
Payer: COMMERCIAL

## 2025-02-19 NOTE — TELEPHONE ENCOUNTER
[facility] Post-Op Phone Call                  Surgeon: Benjy Thakkar     Date of Surgery: 2/14/25  Surgery: Inguinal Herniorrhaphy   Discharge Date: 2/14/25    Date/Time Called:   Date: 2/19/2025 Time: 11:13 AM   Attempt: First    Attempted to contact patient to check on them post-operatively. Left a voicemail to call back with any questions or concerns.      Thank you for your time. Please do not hesitate to call us with any questions or concerns.    Call completed by: KELLY Cash [facility] Post-Op Phone Call                     Surgeon:  Date/Time Called:   Date: 2/20/2025 Time: 11:33 AM   Attempt: Second    Pain Control:  Intensity: Mild (1 - 3)  Duration/Location/Explain: with movement , incisional   What makes it better/worse?     Medications:  Narcotic Use - No  Drug type:   Frequency:     Incisions:  Drainage? clean and dry  Any fever type symptoms? No  Comment:   GI:  Nausea? No  Vomiting? No  BM? Yes  Gas? Yes  Voiding Frequency? 4 or more/day   Appetite? Good    Activity:  Walking activity? Yes  Frequency/Type: as tolerated   Restrictions: Avoid heavy lifting for 6 weeks- pt has a very physical job     Return to Work Plans?  Expected date 3/3  Do you need anything from us in this regard? Yes   RTW letter   Post-op appointment made? Yes          Thank you for your time. Please do not hesitate to call us with any questions or concerns.    Call completed by: Chyna Diaz RN

## 2025-02-19 NOTE — LETTER
February 20, 2025      Robert Pimentel  [address] [facility] [address]        To Whom It May Concern:    Robert Pimentel was under my care for surgery on 2/14/25. Please excuse him from work until 3/3/25 to ensure proper healing. He may return on 3/3/25 with a 20 pound weight restriction through 3/28/25.       Sincerely,        Benjy Thakkar MD    Electronically signed

## 2025-02-19 NOTE — PROGRESS NOTES
Federal Family and Medical Leave Act forms received, completed and signed on providers behalf.    Leave start date: 02/14/2025    Leave end date: 02/28/2025    RTW on 03/28/2025 with no restrictions.     Forms faxed to: [employer], attn: [phone].    Fax successful.     Forms labeled and sent to HIM for scanning.    Rhett [place] CMA  She/Her      [facility]  Surgery Clinic [facility]  Weight Management Clinic [address] [address]    Office: [phone]  Fax: [phone]

## 2025-02-19 NOTE — PROGRESS NOTES
Received form request from pt employer, to fill the ADA Substantiation Form. This was filled and faxed to Valeriy to [phone].    Rhett Powell CMA  She/Her      [facility]  Surgery Clinic [facility]  Weight Management Clinic - [address] [address]    Office: [phone]  Fax: [phone]

## 2025-03-03 ENCOUNTER — VIRTUAL VISIT (OUTPATIENT)
Dept: SURGERY | Facility: CLINIC | Age: 50
End: 2025-03-03
Payer: COMMERCIAL

## 2025-03-03 DIAGNOSIS — Z98.890 POSTOPERATIVE STATE: Primary | ICD-10-CM

## 2025-03-03 PROCEDURE — 99024 POSTOP FOLLOW-UP VISIT: CPT

## 2025-03-03 NOTE — PROGRESS NOTES
Virtual Visit Details    Type of service:  Telephone Visit   Phone call duration: 10 minutes   Originating Location (pt. Location): Home    Distant Location (provider location):  On-site  Telephone visit completed due to the patient did not consent to a video visit.     Telemedicine Visit: The patient's condition can be safely assessed and treated via telephone telemedicine encounter.      Reason for Telemedicine Visit: Patient has requested telehealth visit    Originating Site (Patient Location): Patient's home    Distant Site (Provider Location): [facility]    Consent:  The patient/guardian has verbally consented to: the potential risks and benefits of telemedicine (video visit) versus in person care; bill my insurance or make self-payment for services provided; and responsibility for payment of non-covered services.     Mode of Communication: telephone    As the provider I attest to compliance with applicable laws and regulations related to telemedicine.     HPI: Patient is s/p robotic assisted laparoscopic left inguinal herniorraphy with Dr. Thakkar on 02/14/2025.  He is doing well, denies pain but reports intermittent discomfort to his left groin that is rated 1/10 at its worst without requiring analgesics. No difficulties with the surgical wound/wounds. He is eating and drinking well. Denies fever, chills, nausea, vomiting. Bowel function has returned to normal.    Assessment/Plan: Doing well after surgery, he is to abstain from heavy lifting >15 lbs x 6 weeks postoperatively. Following 3/29, no activity restrictions with education to introduce heavy lifting gradually. No need to schedule further follow up at this time, can follow up as needed with clinic information provided.    Length of visit: Greater than 10 minutes were spent in medical discussion, reviewing symptoms and post-op care.    Nereyda Jacques PA-C  [facility]  Surgery [facility]  [address]  [place]  [address]  [place] [address]?  Office: [phone]

## 2025-03-31 ENCOUNTER — MYC MEDICAL ADVICE (OUTPATIENT)
Dept: SURGERY | Facility: CLINIC | Age: 50
End: 2025-03-31
Payer: COMMERCIAL

## 2025-04-02 ENCOUNTER — TELEPHONE (OUTPATIENT)
Dept: FAMILY MEDICINE | Facility: CLINIC | Age: 50
End: 2025-04-02
Payer: COMMERCIAL

## 2025-04-02 NOTE — TELEPHONE ENCOUNTER
Patient Quality Outreach    Patient is due for the following:   Colon Cancer Screening  Physical Preventive Adult Physical    Action(s) Taken:   Schedule a office visit for Yearly Preventive Visit and Colon Cancer Screening    Type of outreach:    Sent EventMama message.    Questions for provider review:    None         Nuha Palomino  Chart routed to None.

## 2025-05-10 ENCOUNTER — MYC MEDICAL ADVICE (OUTPATIENT)
Dept: SURGERY | Facility: CLINIC | Age: 50
End: 2025-05-10
Payer: COMMERCIAL

## 2025-05-28 ENCOUNTER — OFFICE VISIT (OUTPATIENT)
Dept: SURGERY | Facility: CLINIC | Age: 50
End: 2025-05-28
Payer: COMMERCIAL

## 2025-05-28 DIAGNOSIS — R10.32 LEFT GROIN PAIN: Primary | ICD-10-CM

## 2025-05-28 NOTE — LETTER
May 28, 2025      Robert Pimentel  [address] [facility] [address]        To Whom It May Concern:    Robert Pimentel was seen in our clinic on 5/28/2025. He is having residual pain since his hernia repair which occurs when doing heavy lifting for long hours.  We have initiated a treatment program to improve this pain. He has a lifting restriction of 50 lbs for the next 2 months. Please allow appropriate accommodations so that he can fully heal and return to full activity.       Sincerely,          Benjy Thakkar MD  General Surgeon  [facility]  Surgery [address]  [place] [address]  Office: [phone]

## 2025-05-28 NOTE — LETTER
5/28/2025      Robert Pimentel  [address] [facility] [address]      Dear Colleague,    Thank you for referring your patient, Robert Pimentel, to the [facility]. Please see a copy of my visit note below.    General Surgery Clinic Follow up    Patient is a 49-year-old man who is 3 months out from an uneventful robot-assisted left inguinal hernia repair.  He continues to struggle with intermittent pain in the left groin.  He gets pain after lifting for several hours at work.  He does a lot of lifting at his job and needs to be lifting more typically but has been afraid to increase his weight as he is been getting this pain.  The pain is significantly better than it was prior to surgery.  He has no real pain at rest.  He denies pain radiating down his leg.  He denies pain in his testicle or penis.    On exam, his left groin feels normal.  No hernia recurrence.  Mild tenderness to palpation near the internal ring.      Patient doing ok but still with some residual pain.  Pain is better than pre-op and occurs during activity.  I think he could benefit from physical therapy to strengthen this groin and I am hopeful he can find relief in that way.  PT referral ordered.  50 lbs weight restriction at work for 2 more months.  I will check back in with him in 2 months.  If still having pain, will consider imaging vs pain clinic referral.     Benjy Thakkar MD  General Surgeon  [facility]  Surgery [facility] - [address]  [place] [address]  Office: [phone]        Again, thank you for allowing me to participate in the care of your patient.        Sincerely,        Benjy Thakkar MD    Electronically signed

## 2025-05-28 NOTE — PROGRESS NOTES
General Surgery Clinic Follow up    Patient is a 49-year-old man who is 3 months out from an uneventful robot-assisted left inguinal hernia repair.  He continues to struggle with intermittent pain in the left groin.  He gets pain after lifting for several hours at work.  He does a lot of lifting at his job and needs to be lifting more typically but has been afraid to increase his weight as he is been getting this pain.  The pain is significantly better than it was prior to surgery.  He has no real pain at rest.  He denies pain radiating down his leg.  He denies pain in his testicle or penis.    On exam, his left groin feels normal.  No hernia recurrence.  Mild tenderness to palpation near the internal ring.      Patient doing ok but still with some residual pain.  Pain is better than pre-op and occurs during activity.  I think he could benefit from physical therapy to strengthen this groin and I am hopeful he can find relief in that way.  PT referral ordered.  50 lbs weight restriction at work for 2 more months.  I will check back in with him in 2 months.  If still having pain, will consider imaging vs pain clinic referral.     Benjy Thakkar MD  General Surgeon  [facility]  Surgery Clinic [address]  [place] [address]  Office: [phone]

## 2025-08-06 ENCOUNTER — VIRTUAL VISIT (OUTPATIENT)
Dept: SURGERY | Facility: CLINIC | Age: 50
End: 2025-08-06
Payer: COMMERCIAL

## 2025-08-06 DIAGNOSIS — R10.32 LEFT GROIN PAIN: Primary | ICD-10-CM

## (undated) DEVICE — GOWN XLG DISP 9545

## (undated) DEVICE — POSITIONING KIT THE PINK PAD XL 40X20X1IN 40583 (COI)

## (undated) DEVICE — DAVINCI HOT SHEARS TIP COVER  400180

## (undated) DEVICE — DRAPE U SPLIT 74X120" 29440

## (undated) DEVICE — SOL WATER IRRIG 1000ML BOTTLE 2F7114

## (undated) DEVICE — DAVINCI XI DRAPE ARM 470015

## (undated) DEVICE — DRAPE SHEET REV FOLD 3/4 9349

## (undated) DEVICE — TUBING SMOKE EVAC PNEUMOCLEAR HIGH FLOW 0620050250

## (undated) DEVICE — SU MONOCRYL+ 4-0 18IN PS2 UND MCP496G

## (undated) DEVICE — SU WND CLOSURE VLOC 90 3-0 15CML VLOCM0604

## (undated) DEVICE — SYR 50ML SLIP TIP W/O NDL 309654

## (undated) DEVICE — DAVINCI XI SEAL UNIVERSAL 5-12MM 470500

## (undated) DEVICE — NDL INSUFFLATION 13GA 120MM C2201

## (undated) DEVICE — PROTECTOR ARM STANDARD ONE STEP 40433 (COI)

## (undated) DEVICE — Device

## (undated) DEVICE — DAVINCI XI OBTURATOR BLADELESS 8MM 470359

## (undated) DEVICE — GLOVE BIOGEL PI ULTRATOUCH G SZ 7.5 42175

## (undated) DEVICE — VIAL DECANTER STERILE WHITE DYNJDEC06

## (undated) DEVICE — SU VICRYL+ 3-0 27IN SH UND VCP416H

## (undated) DEVICE — PREP CHLORAPREP 26ML TINTED HI-LITE ORANGE 930815

## (undated) DEVICE — SU DERMABOND ADVANCED .7ML DNX12

## (undated) DEVICE — DAVINCI XI DRAPE COLUMN 470341

## (undated) DEVICE — GLOVE BIOGEL PI INDICATOR 8.0 LF 41680

## (undated) DEVICE — LUBRICANT INST ELECTROLUBE EL101

## (undated) DEVICE — CUSTOM PACK LAP CHOLE SBA5BLCHEA

## (undated) DEVICE — BLADE KNIFE SURG 11 371111

## (undated) DEVICE — ELECTRODE PATIENT RETURN ADULT L10 FT 2 PLATE CORD 0855C

## (undated) RX ORDER — FENTANYL CITRATE 50 UG/ML
INJECTION, SOLUTION INTRAMUSCULAR; INTRAVENOUS
Status: DISPENSED
Start: 2025-02-14

## (undated) RX ORDER — LIDOCAINE HYDROCHLORIDE 10 MG/ML
INJECTION, SOLUTION EPIDURAL; INFILTRATION; INTRACAUDAL; PERINEURAL
Status: DISPENSED
Start: 2025-02-14

## (undated) RX ORDER — DEXAMETHASONE SODIUM PHOSPHATE 10 MG/ML
INJECTION, SOLUTION INTRAMUSCULAR; INTRAVENOUS
Status: DISPENSED
Start: 2025-02-14

## (undated) RX ORDER — ONDANSETRON 2 MG/ML
INJECTION INTRAMUSCULAR; INTRAVENOUS
Status: DISPENSED
Start: 2025-02-14

## (undated) RX ORDER — PROPOFOL 10 MG/ML
INJECTION, EMULSION INTRAVENOUS
Status: DISPENSED
Start: 2025-02-14

## (undated) RX ORDER — FENTANYL CITRATE-0.9 % NACL/PF 10 MCG/ML
PLASTIC BAG, INJECTION (ML) INTRAVENOUS
Status: DISPENSED
Start: 2025-02-14

## (undated) RX ORDER — KETOROLAC TROMETHAMINE 30 MG/ML
INJECTION, SOLUTION INTRAMUSCULAR; INTRAVENOUS
Status: DISPENSED
Start: 2025-02-14